# Patient Record
Sex: FEMALE | Race: WHITE | URBAN - METROPOLITAN AREA
[De-identification: names, ages, dates, MRNs, and addresses within clinical notes are randomized per-mention and may not be internally consistent; named-entity substitution may affect disease eponyms.]

---

## 2020-05-01 ENCOUNTER — TELEPHONE (OUTPATIENT)
Dept: ORTHOPEDIC SURGERY | Age: 60
End: 2020-05-01

## 2020-05-01 ENCOUNTER — OFFICE VISIT (OUTPATIENT)
Dept: ORTHOPEDIC SURGERY | Age: 60
End: 2020-05-01

## 2020-05-01 VITALS — WEIGHT: 183 LBS | TEMPERATURE: 98.2 F | HEIGHT: 63 IN | BODY MASS INDEX: 32.43 KG/M2

## 2020-05-01 PROCEDURE — L0625 LO FLEX L1-BELOW L5 PRE OTS: HCPCS | Performed by: ORTHOPAEDIC SURGERY

## 2020-05-01 PROCEDURE — L0626 LO SAG RIG PNL STAYS PRE CST: HCPCS | Performed by: ORTHOPAEDIC SURGERY

## 2020-05-01 PROCEDURE — 99204 OFFICE O/P NEW MOD 45 MIN: CPT | Performed by: ORTHOPAEDIC SURGERY

## 2020-05-01 RX ORDER — LISINOPRIL 20 MG/1
20 TABLET ORAL DAILY
COMMUNITY
Start: 2019-09-30

## 2020-05-01 RX ORDER — HYDROCODONE BITARTRATE AND ACETAMINOPHEN 7.5; 325 MG/1; MG/1
1 TABLET ORAL EVERY 6 HOURS PRN
Qty: 28 TABLET | Refills: 0 | Status: SHIPPED | OUTPATIENT
Start: 2020-05-01 | End: 2020-05-08

## 2020-05-01 RX ORDER — SERTRALINE HYDROCHLORIDE 100 MG/1
100 TABLET, FILM COATED ORAL DAILY
COMMUNITY
Start: 2019-10-29
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
## 2020-05-04 ENCOUNTER — OFFICE VISIT (OUTPATIENT)
Dept: ORTHOPEDIC SURGERY | Age: 60
End: 2020-05-04

## 2020-05-04 VITALS — WEIGHT: 182.98 LBS | BODY MASS INDEX: 32.42 KG/M2 | TEMPERATURE: 99.4 F | HEIGHT: 63 IN

## 2020-05-04 PROBLEM — M54.50 ACUTE LOW BACK PAIN: Status: ACTIVE | Noted: 2020-05-04

## 2020-05-04 PROBLEM — S32.040A COMPRESSION FRACTURE OF FOURTH LUMBAR VERTEBRA (HCC): Status: ACTIVE | Noted: 2020-05-04

## 2020-05-04 PROCEDURE — 99214 OFFICE O/P EST MOD 30 MIN: CPT | Performed by: ORTHOPAEDIC SURGERY

## 2020-05-04 NOTE — PROGRESS NOTES
5/4/20  History of Present Illness:  Andrea Baron is a 62 y.o. female recheck evaluation lumbar spine here today to look at her MRI and discuss plan for this fracture    Location lumbar spine  Severity moderate to severe  Duration approximately 2-1/2 weeks ago  Associated sign/symptoms pain, loss of motion, trouble ambulating without significant discomfort    I have reviewed and discussed the below Pain assessment findings with the patient. Medical History  Patient's medications, allergies, past medical, surgical, social and family histories were reviewed and updated as appropriate. History reviewed. No pertinent past medical history. History reviewed. No pertinent family history.   Social History     Socioeconomic History    Marital status: None     Spouse name: None    Number of children: None    Years of education: None    Highest education level: None   Occupational History    None   Social Needs    Financial resource strain: None    Food insecurity     Worry: None     Inability: None    Transportation needs     Medical: None     Non-medical: None   Tobacco Use    Smoking status: Never Smoker    Smokeless tobacco: Never Used   Substance and Sexual Activity    Alcohol use: None    Drug use: None    Sexual activity: None   Lifestyle    Physical activity     Days per week: None     Minutes per session: None    Stress: None   Relationships    Social connections     Talks on phone: None     Gets together: None     Attends Temple service: None     Active member of club or organization: None     Attends meetings of clubs or organizations: None     Relationship status: None    Intimate partner violence     Fear of current or ex partner: None     Emotionally abused: None     Physically abused: None     Forced sexual activity: None   Other Topics Concern    None   Social History Narrative    None     Current Outpatient Medications   Medication Sig Dispense Refill    lisinopril Extremity: Examination of the left upper extremity does not show any tenderness, deformity or injury. Range of motion is unremarkable. There is no gross instability. There are no rashes, ulcerations or lesions. Strength and tone are normal.  Neck: Examination of the neck does not show any tenderness, deformity or injury. Range of motion is unremarkable. There is no gross instability. There are no rashes, ulcerations or lesions. Strength and tone are normal.      Diagnostic Testing:    Views MRI multiple views taken in the office today Body Part lumbar spine impression L4 compression fracture with L5 nerve root impingement        Assessment: 25% compression fracture L4 and L5 nerve root impingement on the right    Impression: Same    Office Procedures:  No orders of the defined types were placed in this encounter. Treatment Plan: Brace, rest, follow-up in 2 weeks for another x-ray        Baldomero Morin. IRWIN Nelson. [address] [place] [address] and Sports Medicine  Sports Fellowship Trained  Board Certified  [employer] Team Physician    Disclaimer: \"This note was dictated with voice recognition software. Though review and correction are routine, we apologize for any errors. \"

## 2020-05-18 ENCOUNTER — OFFICE VISIT (OUTPATIENT)
Dept: ORTHOPEDIC SURGERY | Age: 60
End: 2020-05-18

## 2020-05-18 VITALS — WEIGHT: 182.98 LBS | HEIGHT: 63 IN | BODY MASS INDEX: 32.42 KG/M2

## 2020-05-18 PROCEDURE — 99213 OFFICE O/P EST LOW 20 MIN: CPT | Performed by: ORTHOPAEDIC SURGERY

## 2020-05-18 NOTE — PROGRESS NOTES
Medications   Medication Sig Dispense Refill    lisinopril (PRINIVIL;ZESTRIL) 20 MG tablet Take 20 mg by mouth daily      sertraline (ZOLOFT) 100 MG tablet Take 100 mg by mouth daily       No current facility-administered medications for this visit. No Known Allergies    REVIEW OF SYSTEMS:   No rashes today  No new numbness  No tingling  No fevers  No depression  No new onset of pain      Pertinent items are noted in HPI  Review of systems reviewed from Patient History Form dated on 5/4/2020 and available in the patient's chart under the Media tab. Examination:    Vitals: Height 5' 2.99\" (1.6 m), weight 182 lb 15.7 oz (83 kg). BMI Readings from Last 3 Encounters:   05/18/20 32.42 kg/m²   05/04/20 32.42 kg/m²   05/01/20 32.42 kg/m²       LUMBAR/SACRAL EXAMINATION:  · Inspection: Local inspection shows no step-off or bruising. Lumbar alignment is normal. No instability is noted. · Constitutional: Patient is adequately groomed with no evidence of malnutrition  · Lymphatic: The lymphatic examination bilaterally reveals all areas to be without enlargement or induration. · Mental Status: The patient is oriented to time, place and person. The patient's mood and   affect are appropriate. · Vascular: Examination reveals no swelling or calf tenderness. Peripheral pulses are palpable and 2+. · Palpation:   No evidence of tenderness at the midline. Lumbar paraspinal tenderness Mild L4/5 and L5/S1 tenderness  Bursal tenderness No tenderness bilaterally  There is no paraspinal spasm. · Range of Motion: limited by 50% in all planes due to pain  · Strength:   Strength testing is 5/5 in all muscle groups tested. · Special Tests:   Straight leg raise and crossed SLR negative. Nolan's testing is negative bilaterally. FADIR's testing is negative bilaterally. Slump test negative. Bowstring test negative  · Skin: There are no rashes, ulcerations or lesions.   · Reflexes: document is confidential medical information. Unauthorized disclosure or use of this information is prohibited by law. If you are not the intended recipient of this document, please advise us by calling immediately [phone]. [id number] [facility], [address] Patient Name: Nitin Baig Case ID: [id number] Patient : 1960 Referring Physician: Ajay Kim DO Exam Date: 2020 Exam Description: MR Lumbar Spine w/o Contrast HISTORY:  Acute low back pain, unspecified back pain laterally, unspecified whether sciatica present. TECHNICAL FACTORS:  Long- and short-axis fat- and water-weighted images were performed. COMPARISON:  None. FINDINGS:  Conus medullaris unremarkable. Small hemangioma in the posterior aspect of the T11 vertebral body. Multilevel mild anterior endplate hypertrophy. At T11-12, no disc herniation. Low-grade facet hypertrophy with ligamentum flavum thickening and contour alteration of the dorsal thecal sac. At T12-L1, no disc herniation. Low-grade bilateral facet hypertrophy. At L1-2 and L2-3, no disc herniation. No spinal canal or foraminal stenosis at these levels. At L3-4, no compressive disc herniation. Compression fracture/concavity at the superior endplate of L4, with vertebral body height decrease by approximately 25%, without retropulsion. Surrounding high-grade marrow edema in the L4 vertebral body, extending to the bilateral pedicles. Moderate to high-grade right facet hypertrophy with high-grade marrow edema. Mild ligamentum flavum thickening. At L4-5, grade 1 anterolisthesis with mild pseudodisc of listhesis. High-grade left and moderate to high-grade right facet hypertrophy. Low-grade spinal stenosis. At L5-S1, mild disc space narrowing and desiccation with anterior endplate hypertrophy. Midline to left parasagittal protrusion measuring 2mm in AP dimension with mild contour alteration of the ventral thecal sac.   Right foraminal to extraforaminal protrusion Making):    I discussed the diagnosis and the treatment options with [facility] today. In Summary:  The various treatment options were outlined and discussed with [facility] including:  Conservative care options: physical therapy, ice, medications, bracing, and activity modification. The indications for therapeutic injections. The indications for additional imaging/laboratory studies. The indications for (possible future) interventions. After considering the various options discussed, [facility] elected to pursue a course of treatment that includes the followin. Medications: No further recommendations for new medications. 2. PT:  Prescribed home exercise program.    3. Further studies: No further studies. 4. Interventional: Continue to wear brace and follow-up    5. Healthy Lifestyle Measures:  Patient education material reviewing the following was distributed to [facility]  Healthy lifestyle education  Osteoporosis prevention  Back and neck pain educational information   Advanced imaging preparedness    Posture education   Proper lifting and carrying techniques,   Weight management discussed  Minor ways to treat back pain  For further information regarding the spine conditions and to review interventional treatments the patient was directed to [facility] [employer].    6.  Follow up:  2-3 weeks    [facility] was instructed to call the office if her symptoms worsen or if new symptoms appear prior to the next scheduled visit. She is specifically instructed to contact the office between now & her scheduled appointment if she has concerns related to her condition or if she needs assistance in scheduling the above tests. She is   welcome to call for an appointment sooner if she has any additional concerns or questions. Maryan Nelson D.O.   [address] [place] [address] [address] and Sports Medicine  Sports Fellowship Trained  Board Certified  [employer] Physician      Disclaimer: \"This note was dictated with voice recognition software. Though review and correction are routine, we apologize for any errors. \"

## 2020-06-08 ENCOUNTER — OFFICE VISIT (OUTPATIENT)
Dept: ORTHOPEDIC SURGERY | Age: 60
End: 2020-06-08

## 2020-06-08 VITALS — TEMPERATURE: 97.2 F | BODY MASS INDEX: 32.42 KG/M2 | HEIGHT: 63 IN | WEIGHT: 182.98 LBS

## 2020-06-08 PROCEDURE — 99214 OFFICE O/P EST MOD 30 MIN: CPT | Performed by: ORTHOPAEDIC SURGERY

## 2020-06-08 NOTE — PROGRESS NOTES
6/8/20  History of Present Illness:  Marilu Garcia is a 62 y.o. female recheck evaluation L4 compression fracture approximately 7 weeks since injury    Chief complaint that brought the patient in the office today: L4 compression fracture      Location L4  Severity no significant discomfort occasional right-sided low lumbar pain  Duration 7 weeks  Associated sign/symptoms occasional pain and stiffness she gets more pain when she sits for long periods of time    I have reviewed and discussed the below Pain assessment findings with the patient. Medical History  Patient's medications, allergies, past medical, surgical, social and family histories were reviewed and updated as appropriate. History reviewed. No pertinent past medical history. History reviewed. No pertinent family history. Social History     Socioeconomic History    Marital status:       Spouse name: None    Number of children: None    Years of education: None    Highest education level: None   Occupational History    None   Social Needs    Financial resource strain: None    Food insecurity     Worry: None     Inability: None    Transportation needs     Medical: None     Non-medical: None   Tobacco Use    Smoking status: Never Smoker    Smokeless tobacco: Never Used   Substance and Sexual Activity    Alcohol use: None    Drug use: None    Sexual activity: None   Lifestyle    Physical activity     Days per week: None     Minutes per session: None    Stress: None   Relationships    Social connections     Talks on phone: None     Gets together: None     Attends [employer] service: None     Active member of club or organization: None     Attends meetings of clubs or organizations: None     Relationship status: None    Intimate partner violence     Fear of current or ex partner: None     Emotionally abused: None     Physically abused: None     Forced sexual activity: None   Other Topics Concern    None   Social History negative  · Skin: There are no rashes, ulcerations or lesions. · Reflexes: Reflexes are symmetrically 2+ at the patellar and ankle tendons. Clonus absent bilaterally at the feet. · Gait & station: normal, patient ambulates without assistance and no ataxia    · Additional Examinations:    · RIGHT LOWER EXTREMITY: Inspection/examination of the right lower extremity does not show any tenderness, deformity or injury. Range of motion is unremarkable. There is no gross instability. There are no rashes, ulcerations or lesions. Strength and tone are normal. No atrophy or abnormal movements are noted. · LEFT LOWER EXTREMITY:  Inspection/examination of the left lower extremity does not show any tenderness, deformity or injury. Range of motion is unremarkable. There is no gross instability. There are no rashes, ulcerations or lesions. Strength and tone are normal. No atrophy or abnormal movements are noted. Associated signs and symptoms:   Neurogenic bowel or bladder symptoms:  no   Perceived weakness:  yes   Difficulty walking:  no    Additional Comments: She has more pain with sitting then she does standing and walking        Diagnostic Testing:    Views AP lateral and I independently reviewed these films today in my office, Body Part lumbar spine impression L4 compression fracture unchanged from the previous x-ray approximately 3 weeks ago    Impression no significant bony abnormality  MRI: Not at this time we did one earlier  Labs: none at this time    Xr Lumbar Spine (2-3 Views)    Result Date: 5/18/2020  Radiology exam is complete. No Radiologist dictation. Please follow up with ordering provider. History reviewed. No pertinent surgical history. .    Office Procedures:  Orders Placed This Encounter   Procedures    XR LUMBAR SPINE (2-3 VIEWS)     Standing Status:   Future     Number of Occurrences:   1     Standing Expiration Date:   6/5/2021       Previous Treatments: Brace, rest, MRI, X-ray,

## 2020-07-06 ENCOUNTER — OFFICE VISIT (OUTPATIENT)
Dept: ORTHOPEDIC SURGERY | Age: 60
End: 2020-07-06

## 2020-07-06 PROCEDURE — 99213 OFFICE O/P EST LOW 20 MIN: CPT | Performed by: ORTHOPAEDIC SURGERY

## 2020-07-06 NOTE — PROGRESS NOTES
7/6/20  History of Present Illness:  Joy Gamez is a 62 y.o. female recheck evaluation about 11 weeks post compression fracture she is doing well no significant symptomatology occasionally she gets some right-sided low back discomfort    Chief complaint that brought the patient in the office today: 11 weeks post compression fracture L4      Location L4 compression fracture  Severity improving  Duration 11 weeks  Associated sign/symptoms no real symptomatology at this time    Medical History  Patient's medications, allergies, past medical, surgical, social and family histories were reviewed and updated as appropriate. No past medical history on file. No family history on file. Social History     Socioeconomic History    Marital status:       Spouse name: Not on file    Number of children: Not on file    Years of education: Not on file    Highest education level: Not on file   Occupational History    Not on file   Social Needs    Financial resource strain: Not on file    Food insecurity     Worry: Not on file     Inability: Not on file    Transportation needs     Medical: Not on file     Non-medical: Not on file   Tobacco Use    Smoking status: Never Smoker    Smokeless tobacco: Never Used   Substance and Sexual Activity    Alcohol use: Not on file    Drug use: Not on file    Sexual activity: Not on file   Lifestyle    Physical activity     Days per week: Not on file     Minutes per session: Not on file    Stress: Not on file   Relationships    Social connections     Talks on phone: Not on file     Gets together: Not on file     Attends Taoist service: Not on file     Active member of club or organization: Not on file     Attends meetings of clubs or organizations: Not on file     Relationship status: Not on file    Intimate partner violence     Fear of current or ex partner: Not on file     Emotionally abused: Not on file     Physically abused: Not on file     Forced sexual activity: Not on file   Other Topics Concern    Not on file   Social History Narrative    Not on file     Current Outpatient Medications   Medication Sig Dispense Refill    lisinopril (PRINIVIL;ZESTRIL) 20 MG tablet Take 20 mg by mouth daily      sertraline (ZOLOFT) 100 MG tablet Take 100 mg by mouth daily       No current facility-administered medications for this visit. No Known Allergies    REVIEW OF SYSTEMS:   No rashes today  No new numbness  No tingling  No fevers  No depression  No new onset of pain      Pertinent items are noted in HPI  Review of systems reviewed from Patient History Form dated on 5/4/2020 and available in the patient's chart under the Media tab. Examination:    Vitals: There were no vitals taken for this visit. BMI Readings from Last 3 Encounters:   06/08/20 32.42 kg/m²   05/18/20 32.42 kg/m²   05/04/20 32.42 kg/m²       LUMBAR/SACRAL EXAMINATION:  · Inspection: Local inspection shows no step-off or bruising. Lumbar alignment is normal. No instability is noted. · Constitutional: Patient is adequately groomed with no evidence of malnutrition  · Lymphatic: The lymphatic examination bilaterally reveals all areas to be without enlargement or induration. · Mental Status: The patient is oriented to time, place and person. The patient's mood and   affect are appropriate. · Vascular: Examination reveals no swelling or calf tenderness. Peripheral pulses are palpable and 2+. · Palpation:   No evidence of tenderness at the midline. Lumbar paraspinal tenderness No tenderness to palpation of the lumbar paraspinals  Bursal tenderness No tenderness bilaterally  There is no paraspinal spasm. · Range of Motion: limited by 25% in all planes due to pain  · Strength:   Strength testing is 5/5 in all muscle groups tested. · Special Tests:   Straight leg raise and crossed SLR negative. Nolan's testing is negative bilaterally.  FADIR's testing is Procedures:  Orders Placed This Encounter   Procedures    XR LUMBAR SPINE (2-3 VIEWS)     Standing Status:   Future     Number of Occurrences:   1     Standing Expiration Date:   2021       Previous Treatments: MRI, physical therapy, brace  , X-ray, anti-inflammatories,    Differential Diagnoses: Compression fracture, radiculopathy, spondylolisthesis, infection, contusion, hip pathology, Muscle injury, bone tumor, femoral acetabular osteoarthritis or stress fracture. Diagnosis L4 compression fracture lumbar spine      Plan: (Medical Decision Making)    Plan (Medical Decision Making):    I discussed the diagnosis and the treatment options with [facility] today. In Summary:  The various treatment options were outlined and discussed with [facility] including:  Conservative care options: physical therapy, ice, medications, bracing, and activity modification. The indications for therapeutic injections. The indications for additional imaging/laboratory studies. The indications for (possible future) interventions. After considering the various options discussed, [facility] elected to pursue a course of treatment that includes the followin. Medications: No further recommendations for new medications. 2. PT:  Prescribed home exercise program.    3. Further studies: No further studies. 4. Interventional: Continue to work with her legs to his left and do her exercises regularly and follow-up as needed    5.  Healthy Lifestyle Measures:  Patient education material reviewing the following was distributed to [facility]  Healthy lifestyle education  Osteoporosis prevention  Back and neck pain educational information   Advanced imaging preparedness    Posture education   Proper lifting and carrying techniques,   Weight management discussed  Minor ways to treat back pain  For further information regarding the spine conditions and to review interventional treatments the patient was directed to [place].    6.  Follow up:  as needed    Stephanie Bowen was instructed to call the office if her symptoms worsen or if new symptoms appear prior to the next scheduled visit. She is specifically instructed to contact the office between now & her scheduled appointment if she has concerns related to her condition or if she needs assistance in scheduling the above tests. She is   welcome to call for an appointment sooner if she has any additional concerns or questions. Flor Nelson D.O. [address] [place] [address] and Sports Medicine  Sports Fellowship Trained  Board Certified  [employer] Team Physician      Disclaimer: \"This note was dictated with voice recognition software. Though review and correction are routine, we apologize for any errors. \"